# Patient Record
Sex: MALE | Race: BLACK OR AFRICAN AMERICAN | NOT HISPANIC OR LATINO | URBAN - METROPOLITAN AREA
[De-identification: names, ages, dates, MRNs, and addresses within clinical notes are randomized per-mention and may not be internally consistent; named-entity substitution may affect disease eponyms.]

---

## 2017-04-26 ENCOUNTER — TELEPHONE (OUTPATIENT)
Dept: TRANSPLANT | Facility: CLINIC | Age: 22
End: 2017-04-26

## 2017-04-26 NOTE — TELEPHONE ENCOUNTER
"Living Kidney Donor Evaluation Completed: 2017 16:07:16 CT Updated: 2017 16:09:18 CT  Donor Name: Arabella Cervantes MRN: Note: : 1995 Age: 21Gender: Male Donor Height: 5  9\" Weight (lb): 170 BMI: 25.1  Donor Race: Black or  Ethnicity: Not / Donor Preferred Language: English  Required?: No Current Marital Status: Single  Demographics: Home Address: [address] [place] [address] City: [place] State: [place] Zip: [address] Country: [place]  Best Phone: [phone] Alt Phone: Donor Email: Best Phone Type: Mobile Alt Phone Type:   Preferred Contact Time(s): 09:00 AM-11:00 AM, 11:00 AM-1:00 PM, 1:00 PM-4:00 PM Preferred Contact Day(s): Mon, Tue, Wed, Thur, Fri  Donor Screen: PASSED Donor Referred by: Tx Candidate Donor self reported ABO: Unknown  Recipient Information: Recipient Name (Last, First): Sharla Adam Recipient :    ... Donor Relationship: Close friend Recipient Diagnosis: Recipient ABO:   MEDICAL HISTORY:  None Reported  MEDICATIONS:  None Reported  SURGICAL HISTORY:  Appendectomy  Tonsillectomy  ALLERGIES:  NKDA  SOCIAL HISTORY:  EtOH: Rare (1-2 drinks/month)  Illicit Drug Use: Denies  Tobacco: Current (1/2 ppd x 4 years)  SELF-REPORTED FUNCTIONAL STATUS:  \"I am able to participate in moderate recreational activities like golf, double tennis, dancing, throwing a baseball or football\"  Exercise (Not on a regular basis)  REVIEW OF ORGAN SYSTEMS: Airway or Lungs: No Blood Disorder: No Cancer: No Diabetes,Thyroid,Adrenal,Endocrine Disorder: No Digestive or Liver: No Heart or Circulatory System: No Immune Diseases: No Kidneys and Bladder: No Male Health: No Muscles,Bones,Joints: No Neuro: No Psych: No  FAMILY HISTORY: Confirmed:  Denied:  Cancer (denies)  Diabetes (denies)  Heart Disease (denies)  Hypertension (denies)  Kidney Disease (denies)  Kidney Stones (denies)  DONOR INFORMATION:  Level of Education: Some secondary or high school education Employment Status: " Unemployed Medical Insurance Status: No medical insurance Current Accommodation: Other Living Arrangement: With relatives other than spouse, parents Allow Disclosure to Recipient: Yes Paired Kidney Exchange Education Level: General idea Paired Kidney Exchange Participation Consent: Unsure Donor Motivation: Highly motivated donor  HIGH RISK BEHAVIOR:  Blood transfusion < 12 months. (NO)  Commercial sex < 12 months. (NO)  Illicit IV drug use < 5yrs. (NO)  Male:male sexual contact < 5yrs. (NO)  Other high risk sexual contact < 12 months. (NO)  EMERGENCY CONTACT INFORMATION:  Primary Secondary First Name: Last Name: Phone Number: Relationship:   First Name: Yasmeen  Last Name: Evon Phone Number: [phone] Relationship: Mother  REASON FOR DONATION:   Because vidhya Adam is my best friends mom an I want to help her in any way possible  PHYSICIAN CONTACT INFORMATION:  PCP  Name: [facility]   City: [place] State: [place]  Phone: [phone]  ADDITIONAL NOTES:

## 2017-04-26 NOTE — TELEPHONE ENCOUNTER
Unknown abo. Interested in PEP. Discussed smoking cessation and willing to quit. Will now send all Phase 1 orders with donor pkt.